# Patient Record
Sex: FEMALE | Race: BLACK OR AFRICAN AMERICAN | Employment: FULL TIME | URBAN - METROPOLITAN AREA
[De-identification: names, ages, dates, MRNs, and addresses within clinical notes are randomized per-mention and may not be internally consistent; named-entity substitution may affect disease eponyms.]

---

## 2018-04-02 ENCOUNTER — OP HISTORICAL/CONVERTED ENCOUNTER (OUTPATIENT)
Dept: OTHER | Age: 33
End: 2018-04-02

## 2019-05-24 ENCOUNTER — OP HISTORICAL/CONVERTED ENCOUNTER (OUTPATIENT)
Dept: OTHER | Age: 34
End: 2019-05-24

## 2019-05-30 ENCOUNTER — OP HISTORICAL/CONVERTED ENCOUNTER (OUTPATIENT)
Dept: OTHER | Age: 34
End: 2019-05-30

## 2020-10-14 ENCOUNTER — NURSE TRIAGE (OUTPATIENT)
Dept: OBGYN CLINIC | Age: 35
End: 2020-10-14

## 2020-10-14 DIAGNOSIS — N76.0 BACTERIAL VAGINITIS: Primary | ICD-10-CM

## 2020-10-14 DIAGNOSIS — B37.9 CANDIDIASIS: ICD-10-CM

## 2020-10-14 DIAGNOSIS — B96.89 BACTERIAL VAGINITIS: Primary | ICD-10-CM

## 2020-10-14 RX ORDER — FLUCONAZOLE 150 MG/1
150 TABLET ORAL DAILY
Qty: 1 TAB | Refills: 0 | Status: SHIPPED | OUTPATIENT
Start: 2020-10-14 | End: 2020-10-15

## 2020-10-14 RX ORDER — METRONIDAZOLE 500 MG/1
500 TABLET ORAL 2 TIMES DAILY
Qty: 14 TAB | Refills: 0 | Status: SHIPPED | OUTPATIENT
Start: 2020-10-14 | End: 2020-10-21

## 2020-10-14 NOTE — TELEPHONE ENCOUNTER
Patient called c/o a bacterial infection. Per Dr. Bennie Pollock she can have flagyl and diflucan. Medication sent to [facility] in [place].

## 2021-01-06 VITALS — HEIGHT: 64 IN

## 2021-01-06 PROBLEM — D25.9 UTERINE LEIOMYOMA: Status: ACTIVE | Noted: 2021-01-06

## 2021-01-06 PROBLEM — N93.0 POSTCOITAL BLEEDING: Status: ACTIVE | Noted: 2021-01-06

## 2021-01-06 PROBLEM — E66.9 OBESITY: Status: ACTIVE | Noted: 2021-01-06

## 2021-01-06 PROBLEM — J45.909 ASTHMA: Status: ACTIVE | Noted: 2021-01-06

## 2021-01-07 ENCOUNTER — OFFICE VISIT (OUTPATIENT)
Dept: OBGYN CLINIC | Age: 36
End: 2021-01-07
Payer: COMMERCIAL

## 2021-01-07 VITALS
DIASTOLIC BLOOD PRESSURE: 72 MMHG | HEIGHT: 66 IN | BODY MASS INDEX: 41.95 KG/M2 | SYSTOLIC BLOOD PRESSURE: 110 MMHG | WEIGHT: 261 LBS

## 2021-01-07 DIAGNOSIS — N89.8 VAGINAL DISCHARGE, BLOODY: Primary | ICD-10-CM

## 2021-01-07 DIAGNOSIS — N89.8 VAGINAL ODOR: ICD-10-CM

## 2021-01-07 DIAGNOSIS — N89.8 VAGINAL IRRITATION: ICD-10-CM

## 2021-01-07 PROCEDURE — 99214 OFFICE O/P EST MOD 30 MIN: CPT | Performed by: OBSTETRICS & GYNECOLOGY

## 2021-01-07 RX ORDER — FLUCONAZOLE 150 MG/1
TABLET ORAL
Qty: 1 TAB | Refills: 0 | Status: SHIPPED | OUTPATIENT
Start: 2021-01-07

## 2021-01-07 RX ORDER — METRONIDAZOLE 500 MG/1
TABLET ORAL
Qty: 25 TAB | Refills: 0 | Status: SHIPPED | OUTPATIENT
Start: 2021-01-07

## 2021-01-07 NOTE — PROGRESS NOTES
Bud Coronel is a , 28 y.o. female   Patient's last menstrual period was 2020 (exact date). She presents for her problem    She is having having vaginal discharge x several weeks, greenish d/c with some blood seen, + odor, no  or GI sxs, , no Dysuria, notes some vaginal irritation. Menstrual status:  Her periods are: normal. Cycles are: usually regular with a 26-32 day interval with 3-7 day duration. She does not have dysmenorrhea. Medical conditions:  Since her last annual GYN exam about one year ago, she has not the following changes in her health history: none. Past Medical History:   Diagnosis Date    Asthma 2021    Obesity 2021    Postcoital bleeding 2021    Uterine leiomyoma 2021     Past Surgical History:   Procedure Laterality Date    HX GYN      [facility] FOR POST COITAL BLEEDING    [place] LAP,TUBAL CAUTERY         Prior to Admission medications    Medication Sig Start Date End Date Taking? Authorizing Provider   metroNIDAZOLE (FLAGYL) 500 mg tablet Take 4 tablets by mouth now ant then 1 tablet PO TID x 7 days going forward. 21  Yes Víctor Sibley MD   fluconazole (DIFLUCAN) 150 mg tablet Take 1 tablet by mouth after finishing antibiotics. FDA advises cautious prescribing of oral fluconazole in pregnancy. 21  Yes Víctor Sibley MD       No Known Allergies       Tobacco History:  reports that she has never smoked. She has never used smokeless tobacco.  Alcohol Abuse:  reports previous alcohol use. Drug Abuse:  reports previous drug use. Family Medical/Cancer History:   Family History   Problem Relation Age of Onset    No Known Problems Mother     No Known Problems Father           Review of Systems   Constitutional: Negative for chills, fever, malaise/fatigue and weight loss. HENT: Negative for congestion, ear pain, sinus pain and tinnitus. Eyes: Negative for blurred vision and double vision.    Respiratory: Negative for cough, shortness of breath and wheezing. Cardiovascular: Negative for chest pain and palpitations. Gastrointestinal: Negative for abdominal pain, blood in stool, constipation, diarrhea, heartburn, nausea and vomiting. Genitourinary: Negative for dysuria, flank pain, frequency, hematuria and urgency. Musculoskeletal: Negative for joint pain and myalgias. Skin: Negative for itching and rash. Neurological: Negative for dizziness, weakness and headaches. Psychiatric/Behavioral: Negative for depression, memory loss and suicidal ideas. The patient is not nervous/anxious and does not have insomnia. Physical Exam  Constitutional:       Appearance: Normal appearance. HENT:      Head: Normocephalic and atraumatic. Cardiovascular:      Rate and Rhythm: Normal rate. Heart sounds: Normal heart sounds. Pulmonary:      Effort: Pulmonary effort is normal.      Breath sounds: Normal breath sounds. Abdominal:      General: Abdomen is flat. Palpations: Abdomen is soft. Genitourinary:     General: Normal vulva. Vagina: Vaginal discharge present. Cervix: Normal.      Uterus: Normal.       Adnexa: Right adnexa normal and left adnexa normal.      Rectum: Normal.      Comments: Culture obtained  Neurological:      Mental Status: She is alert. Psychiatric:         Mood and Affect: Mood normal.         Behavior: Behavior normal.         Thought Content: Thought content normal.          Visit Vitals  /72 (BP 1 Location: Left arm, BP Patient Position: Sitting)   Ht 5' 6\" (1.676 m)   Wt 261 lb (118.4 kg)   LMP 12/25/2020 (Exact Date)   BMI 42.13 kg/m²         Assessment:  Diagnoses and all orders for this visit:    1. Vaginal discharge, bloody  -     NUSWAB VAGINITIS PLUS (VG+) WITH CANDIDA (SIX SPECIES)  -     metroNIDAZOLE (FLAGYL) 500 mg tablet; Take 4 tablets by mouth now ant then 1 tablet PO TID x 7 days going forward. -     fluconazole (DIFLUCAN) 150 mg tablet;  Take 1 tablet by mouth after finishing antibiotics. FDA advises cautious prescribing of oral fluconazole in pregnancy. 2. Vaginal odor    3.  Vaginal irritation        Plan:Questions addressed  Counseled re: diet, exercise, healthy lifestyle

## 2021-01-12 LAB
A VAGINAE DNA VAG QL NAA+PROBE: ABNORMAL SCORE
BVAB2 DNA VAG QL NAA+PROBE: ABNORMAL SCORE
C ALBICANS DNA VAG QL NAA+PROBE: NEGATIVE
C GLABRATA DNA VAG QL NAA+PROBE: NEGATIVE
C KRUSEI DNA VAG QL NAA+PROBE: NEGATIVE
C LUSITANIAE DNA VAG QL NAA+PROBE: NEGATIVE
C TRACH DNA VAG QL NAA+PROBE: NEGATIVE
CANDIDA DNA VAG QL NAA+PROBE: NEGATIVE
MEGA1 DNA VAG QL NAA+PROBE: ABNORMAL SCORE
N GONORRHOEA DNA VAG QL NAA+PROBE: NEGATIVE
T VAGINALIS DNA VAG QL NAA+PROBE: POSITIVE

## 2021-01-12 NOTE — PROGRESS NOTES
1/12/21: Nu-swab: + BV, + Trich  DWP  Script given at appt. RAMÍREZ in 5-6 weeks  Instructed pt.  That  Her Partner needs treatment

## 2021-10-29 ENCOUNTER — TELEPHONE (OUTPATIENT)
Dept: OBGYN CLINIC | Age: 36
End: 2021-10-29

## 2021-10-29 NOTE — TELEPHONE ENCOUNTER
Patient called requesting a refill on Metronidazole because she thinks she may have Trich again. Advised her she will need an appt to be evaluated but there are no physicians in the office today. Advised her she may see her PCP, go to an urgent care or the ER.

## 2022-03-18 PROBLEM — E66.9 OBESITY: Status: ACTIVE | Noted: 2021-01-06

## 2022-03-19 PROBLEM — D25.9 UTERINE LEIOMYOMA: Status: ACTIVE | Noted: 2021-01-06

## 2022-03-19 PROBLEM — N93.0 POSTCOITAL BLEEDING: Status: ACTIVE | Noted: 2021-01-06

## 2022-03-19 PROBLEM — J45.909 ASTHMA: Status: ACTIVE | Noted: 2021-01-06

## 2022-11-04 ENCOUNTER — TELEPHONE (OUTPATIENT)
Dept: OBGYN CLINIC | Age: 37
End: 2022-11-04

## 2023-05-18 RX ORDER — METRONIDAZOLE 500 MG/1
TABLET ORAL
COMMUNITY
Start: 2021-01-07

## 2023-05-18 RX ORDER — FLUCONAZOLE 150 MG/1
TABLET ORAL
COMMUNITY
Start: 2021-01-07